# Patient Record
Sex: MALE | Race: BLACK OR AFRICAN AMERICAN
[De-identification: names, ages, dates, MRNs, and addresses within clinical notes are randomized per-mention and may not be internally consistent; named-entity substitution may affect disease eponyms.]

---

## 2019-02-13 ENCOUNTER — HOSPITAL ENCOUNTER (EMERGENCY)
Dept: HOSPITAL 80 - FED | Age: 34
Discharge: HOME | End: 2019-02-13
Payer: COMMERCIAL

## 2019-02-13 VITALS — SYSTOLIC BLOOD PRESSURE: 106 MMHG | DIASTOLIC BLOOD PRESSURE: 75 MMHG

## 2019-02-13 DIAGNOSIS — Z86.718: ICD-10-CM

## 2019-02-13 DIAGNOSIS — Z79.01: ICD-10-CM

## 2019-02-13 DIAGNOSIS — Z79.4: ICD-10-CM

## 2019-02-13 DIAGNOSIS — I10: ICD-10-CM

## 2019-02-13 DIAGNOSIS — M79.605: ICD-10-CM

## 2019-02-13 DIAGNOSIS — Z59.0: ICD-10-CM

## 2019-02-13 DIAGNOSIS — M79.604: Primary | ICD-10-CM

## 2019-02-13 DIAGNOSIS — E11.9: ICD-10-CM

## 2019-02-13 LAB — PLATELET # BLD: 216 10^3/UL (ref 150–400)

## 2019-02-13 SDOH — ECONOMIC STABILITY - HOUSING INSECURITY: HOMELESSNESS: Z59.0

## 2019-02-13 NOTE — EDPHY
H & P


Time Seen by Provider: 02/13/19 02:04


HPI/ROS: 





HPI





CHIEF COMPLAINT:  Bilateral Lower extremity Pain





HISTORY OF PRESENT ILLNESS:  33-year-old male, has a history of DVTs on Xarelto

, states he is compliant with his Xarelto, homeless, presents to the emergency 

room from the bus stop.


Patient states that he took a bus from [place] tonight however arrived late in 

to [place] and was not allowed at the homeless shelter.  He states that after 

spending a few hours in the bus station he decided call 911.


He reports to EMS that his legs are swollen.


He reports that his legs are more swollen than normal.


Reports he has a history of DVTs but has been compliant with his Xarelto.


He has to be transported the hospital for evaluation.





Here in the emergency room he arrives in no acute distress.  He denies any 

shortness of breath.  Denies chest pain.


Main complaint bilateral lower extremity swelling worse than normal.





  





Past Medical History:  Patient reports to me has a history of hypertension, on 

HCTZ, DVT on Xarelto, CHF.  Diabetes.  Insulin-dependent.  Denies history of 

coronary artery disease CVA.


Additional medical history from records PE, PTSD, bipolar disorder, 

schizoaffective disorder





Past Surgical History:  Gallbladder surgery





Social History:  Denies drugs alcohol.  Occasional tobacco use.





Family History:  Noncontributory








ROS   


REVIEW OF SYSTEMS:


10 Systems were reviewed and negative with the exception of the elements 

mentioned in the history of present illness.








Exam   


Constitutional  nontoxic no acute distress triage nursing summary reviewed, 

vital signs reviewed, awake/alert.  130/78.  Heart rate 89. Afebrile.  


Eyes   normal conjunctivae and sclera, EOMI, PERRLA. 


HENT   normal inspection, atraumatic, moist mucus membranes, no epistaxis, neck 

supple/ no meningismus, no raccoon eyes. 


Respiratory   clear to auscultation bilaterally, normal breath sounds, no 

respiratory distress, no wheezing. 


Cardiovascular   rate normal, regular rhythm, no murmur, no edema, distal 

pulses normal. 


Gastrointestinal   soft, non-tender, no rebound, no guarding, normal bowel 

sounds, no distension, no pulsatile mass. 


Genitourinary   no CVA tenderness. 


Musculoskeletal  bilateral lower extremity edema appears to be chronic on exam, 

no significant pitting, warm extremities, no midline vertebral tenderness, full 

range of motion, no calf swelling, no tenderness of extremities, no meningismus

, good pulses, neurovascularly intact.


Skin   pink, warm, & dry, no rash, skin atraumatic. 


Neurologic   awake, alert and oriented x 3, AAOx3, moves all 4 extremities 

equally, motor intact, sensory intact, CN II-XII intact, normal cerebellar, 

normal vision, normal speech. 


Psychiatric   normal mood/affect. 


Heme/Lymph/Immune   no lymphadenopathy.





Differential Diagnosis:  Includes but is not limited to in a particular order 

DVTs, CHF, electrolyte disturbance, hyperglycemia, infection.





Medical Decision Making:  Plan for this patient bilateral lower extremity 

ultrasound rule DVTs, basic blood work electrolytes and CBC, chest x-ray re-

evaluate.








Re-evaluation:








EKG:  Time of EKG 2:07 a.m., sinus rhythm RVH present.  However no signs of 

acute ischemia no ST elevation no ST depression.  When I compare this patient's 

EKG to his old EKG dated 6/15/2013 very similar morphology with RVH.





Troponin:  0.01.





BNP:  Unremarkable.  Not elevated.





Ultrasound bilateral lower extremities:  Negative for DVTs in either leg.  

Faxed me by direct Radiology at 2:55 a.m.








1632:  Patient is sleeping no acute distress denies complaints upon wakening.





Chest x-ray negative for acute cardiopulmonary disease.  Image interpreted by 

myself.





EKG interpretation by me on record in [facility] system.  Impression time of 

EKG 5:50 a.m., sinus arrhythmia rate of 69, RVH present.  No acute ischemic 

change appreciated on the EKG.  When compared to the patient's old EKGs these 

are unchanged morphology.





2nd troponin 0.00





0617:  Patient re-evaluated this time resting comfortably denies any chest pain 

or shortness of breath.  He arrived here to the emergency room with leg pain 

bilaterally concern for DVT.  No DVT was seen on either ultrasound.


The patient does not have any chest pain or shortness of breath





He has 2 troponins that are negative





2 nonischemic EKGs.


Chest x-ray on revealing.





Patient has been sleeping for multiple hours in the emergency room.


Upon re-evaluation he has no complaints





I do recommend he keep his legs elevated this will help with edema.


Recommend he continues his home medications including his HCTZ





Recommend he refrain from walking for long periods of time





Return precautions discussed return emergency room if worsening chest pain 

shortness of breath or not doing well.


Source: Patient, EMS





- Personal History


Tetanus Vaccine Date: 09/2012





- Medical/Surgical History


Hx Asthma: No


Hx Chronic Respiratory Disease: No


Hx Diabetes: No


Hx Cardiac Disease: No


Hx Renal Disease: No


Hx Cirrhosis: No


Hx Alcoholism: No


Hx HIV/AIDS: No


Hx Splenectomy or Spleen Trauma: No


Other PMH: sz disorder, urinary/stool incontinence, schizoaffective, bipolar, 

ADD, ADHD, PE, ptsd, DVT





- Social History


Smoking Status: Current every day smoker


Constitutional: 


 Initial Vital Signs











Temperature (C)  36.8 C   02/13/19 02:12


 


Heart Rate  89   02/13/19 02:12


 


Respiratory Rate  18   02/13/19 02:12


 


Blood Pressure  130/78 H  02/13/19 02:12


 


O2 Sat (%)  94   02/13/19 02:12








 











O2 Delivery Mode               Room Air














Allergies/Adverse Reactions: 


 





acetaminophen Allergy (Verified 02/13/19 02:08)


 


aspirin Allergy (Verified 02/13/19 02:08)


 


hydrocodone [From Vicodin] Allergy (Verified 02/13/19 02:08)


 


ibuprofen Allergy (Verified 02/13/19 02:22)


 


oxycodone HCl [From Percocet] Allergy (Verified 03/19/14 13:24)


 


Penicillins Allergy (Verified 06/10/13 19:03)


 








Home Medications: 














 Medication  Instructions  Recorded


 


Hctz Unk  06/10/13


 


Sertraline HCl [Zoloft 50mg (*)] 150 mg PO HS 06/11/13


 


carBAMazepine [Tegretol (RX)] 200 mg PO DAILY 06/11/13


 


carBAMazepine [Tegretol (RX)] 400 mg PO HS 06/11/13


 


Simvastatin  03/19/14


 


Lantus  02/13/19


 


Xarelto  02/13/19














Medical Decision Making





- Data Points


Laboratory Results: 


 Laboratory Results





 02/13/19 02:15 





 02/13/19 02:15 





 











  02/13/19 02/13/19 02/13/19





  02:29 02:15 02:15


 


WBC      6.41 10^3/uL 10^3/uL





     (3.80-9.50) 


 


RBC      4.85 10^6/uL 10^6/uL





     (4.40-6.38) 


 


Hgb      14.8 g/dL g/dL





     (13.7-17.5) 


 


Hct      45.5 % %





     (40.0-51.0) 


 


MCV      93.8 fL fL





     (81.5-99.8) 


 


MCH      30.5 pg pg





     (27.9-34.1) 


 


MCHC      32.5 g/dL g/dL





     (32.4-36.7) 


 


RDW      13.4 % %





     (11.5-15.2) 


 


Plt Count      216 10^3/uL 10^3/uL





     (150-400) 


 


MPV      9.8 fL fL





     (8.7-11.7) 


 


Neut % (Auto)      58.6 % %





     (39.3-74.2) 


 


Lymph % (Auto)      23.7 % %





     (15.0-45.0) 


 


Mono % (Auto)      11.2 % %





     (4.5-13.0) 


 


Eos % (Auto)      5.8 % %





     (0.6-7.6) 


 


Baso % (Auto)      0.5 % %





     (0.3-1.7) 


 


Nucleat RBC Rel Count      0.0 % %





     (0.0-0.2) 


 


Absolute Neuts (auto)      3.76 10^3/uL 10^3/uL





     (1.70-6.50) 


 


Absolute Lymphs (auto)      1.52 10^3/uL 10^3/uL





     (1.00-3.00) 


 


Absolute Monos (auto)      0.72 10^3/uL 10^3/uL





     (0.30-0.80) 


 


Absolute Eos (auto)      0.37 10^3/uL 10^3/uL





     (0.03-0.40) 


 


Absolute Basos (auto)      0.03 10^3/uL 10^3/uL





     (0.02-0.10) 


 


Absolute Nucleated RBC      0.00 10^3/uL 10^3/uL





     (0-0.01) 


 


Immature Gran %      0.2 % %





     (0.0-1.1) 


 


Immature Gran #      0.01 10^3/uL 10^3/uL





     (0.00-0.10) 


 


Sodium    139 mEq/L mEq/L  





    (135-145)  


 


Potassium    4.3 mEq/L mEq/L  





    (3.5-5.2)  


 


Chloride    108 mEq/L mEq/L  





    ()  


 


Carbon Dioxide    27 mEq/l mEq/l  





    (22-31)  


 


Anion Gap    4 mEq/L L mEq/L  





    (6-14)  


 


BUN    25 mg/dL H mg/dL  





    (7-23)  


 


Creatinine    1.2 mg/dL mg/dL  





    (0.7-1.3)  


 


Estimated GFR    > 60   





    


 


Glucose    100 mg/dL mg/dL  





    ()  


 


Calcium    9.0 mg/dL mg/dL  





    (8.5-10.4)  


 


Magnesium    2.0 mg/dL mg/dL  





    (1.6-2.3)  


 


POC Troponin I  0.01 ng/mL ng/mL    





   (0.00-0.08)   


 


NT-Pro-B Natriuret Pep    25 pg/mL pg/mL  





    (0-125)  











Medications Given: 


 








Discontinued Medications





Ibuprofen (Motrin)  600 mg PO EDNOW ONE


   Stop: 02/13/19 02:19


   Last Admin: 02/13/19 02:23 Dose:  Not Given





Point of Care Test Results: 


 Chemistry











  02/13/19





  02:29


 


POC Troponin I  0.01 ng/mL ng/mL





   (0.00-0.08) 














Departure





- Departure


Disposition: Home, Routine, Self-Care


Clinical Impression: 


Leg pain


Qualifiers:


 Laterality: bilateral Qualified Code(s): M79.604 - Pain in right leg; M79.605 

- Pain in left leg; M79.605 - Pain in left leg





Condition: Good


Instructions:  Leg Pain (ED)


Additional Instructions: 


1. Return emergency room if worsening symptoms


2. Recommend keeping her legs elevated


3. Continue home medications


4. Return if worse.


Referrals: 


Patient,NotPresent [Unknown] - As per Instructions


[facility],. [Clinic] - As per Instructions

## 2019-02-16 NOTE — CPEKG
Test Reason : OPEN

Blood Pressure : ***/*** mmHG

Vent. Rate : 069 BPM     Atrial Rate : 069 BPM

   P-R Int : 150 ms          QRS Dur : 104 ms

    QT Int : 408 ms       P-R-T Axes : 067 060 031 degrees

   QTc Int : 437 ms

 

Sinus arrhythmia

Consider right ventricular hypertrophy

 

Confirmed by Aron Tellez ([id number]) on 2/16/2019 7:33:26 AM

 

Referred By: Aron Tellez           Confirmed By:Aron Tellez